# Patient Record
(demographics unavailable — no encounter records)

---

## 2025-01-07 NOTE — DISCUSSION/SUMMARY
[FreeTextEntry1] : 65-year-old gentleman who has a history of chronic right-sided Bell's palsy 2 years ago with residual's.  Patient was advised to pursue consultation with plastic surgery should he desire for the cosmetic reasons.  Patient was reassured that there are no neurologic intervention at this time.  Patient will follow-up with me as needed.  I spent the time noted on the day of this patient encounter preparing for, review of medical records,review of pertinent diagnostic studies, providing and documenting the above E/M service and counseling and educate patient on differential, workup, disease course, and treatment/management. Education was provided to the patient during this encounter. All questions and concerns were answered and addressed in detail. The patient verbalized understanding and agreed to plan. Patient was advised to continue to monitor for neurologic symptoms and to notify my office or go to the nearest emergency room if there are any changes. Any orders/referrals and communications were provided as well. Side effects of the above medications were discussed in detail including but not limited to applicable black box warning and teratogenicity as appropriate. Patient was advised to bring previous records to my office. A copy of the consult note will be sent to the referring physician.

## 2025-01-07 NOTE — PHYSICAL EXAM
[FreeTextEntry1] : Constitutional: alert. Psychiatric: oriented to person, place, and time. Neurologic:  Orientation: oriented to person, oriented to place and oriented to time.  Memory: short term memory intact.  Language: fluency intact.  Fund of knowledge: displays adequate knowledge of current events.  Cranial Nerves: visual acuity intact bilaterally, visual fields full to confrontation, pupils equal round and reactive to light, extraocular motion intact, facial sensation intact symmetrically, face with right peripheral facial droop, hearing was intact bilaterally, head turning and shoulder shrug symmetric and there was no tongue deviation with protrusion.  Motor: muscle tone was normal in all four extremities and muscle strength was normal in all four extremities.  Sensory exam: light touch was intact.  Coordination:. Finger to nose dysmetria was not present.  normal gait Deep tendon reflexes: Biceps right 1+. Biceps left 1+. Patella right 1+. Patella left 1+.

## 2025-01-07 NOTE — HISTORY OF PRESENT ILLNESS
[FreeTextEntry1] : 65-year-old gentleman who developed right-sided Bell's palsy about 2 years ago.  Patient has some minimal residuals from and such as smaller eye opening and slight peripheral facial droop.  Patient has no prior Bell's palsy and we talked about different options for cosmetic treatment.  This includes Botox on the left side to balance out the right side and also plastic surgery if he wants his eye to be more open.  Patient was advised that his symptoms are very mild and it will be purely for cosmetic reasons should he desire.